# Patient Record
Sex: MALE | Race: WHITE | NOT HISPANIC OR LATINO | URBAN - METROPOLITAN AREA
[De-identification: names, ages, dates, MRNs, and addresses within clinical notes are randomized per-mention and may not be internally consistent; named-entity substitution may affect disease eponyms.]

---

## 2022-11-09 ENCOUNTER — OFFICE (OUTPATIENT)
Dept: URBAN - METROPOLITAN AREA CLINIC 105 | Facility: CLINIC | Age: 62
End: 2022-11-09

## 2022-11-09 VITALS
HEART RATE: 67 BPM | HEIGHT: 70 IN | DIASTOLIC BLOOD PRESSURE: 82 MMHG | SYSTOLIC BLOOD PRESSURE: 142 MMHG | WEIGHT: 230 LBS | TEMPERATURE: 95 F

## 2022-11-09 DIAGNOSIS — Z87.11 PERSONAL HISTORY OF PEPTIC ULCER DISEASE: ICD-10-CM

## 2022-11-09 PROCEDURE — 99203 OFFICE O/P NEW LOW 30 MIN: CPT

## 2022-11-09 RX ORDER — SODIUM PICOSULFATE, MAGNESIUM OXIDE, AND ANHYDROUS CITRIC ACID 10; 3.5; 12 MG/160ML; G/160ML; G/160ML
LIQUID ORAL
Qty: 1 | Refills: 0 | Status: ACTIVE
Start: 2022-11-09

## 2022-11-09 NOTE — SERVICENOTES
– Ordered colonoscopy for colon cancer screening with 2-day prep
– Drink clear liquids 2 days before your colonoscopy in 2 days before your colonoscopy take four 5 mg bisacodyl tablets

## 2022-11-09 NOTE — SERVICEHPINOTES
62-year-old male who presents for evaluation for colonoscopy for colon cancer screening. He previously had a colonoscopy 4/2017 that was normal but the prep was suboptimal. He previously had an EGD and colonoscopy in 2007 for melena and was found to have a peptic ulcer at that time. Patient states that he is moving his bowels without issue but may have rare constipation. He denies blood in stool or black stool. He denies issues with heartburn or reflux. He denies personal history of polyps. He denies family history of colon cancer. He does not take any blood thinners.
Reviewed recent labs. Hemoglobin A1c 7.0%. CBC within normal limits. Lipids with triglycerides 177. CMP with glucose 147 but normal liver tests.  
No

## 2022-12-12 ENCOUNTER — AMBULATORY SURGICAL CENTER (OUTPATIENT)
Dept: URBAN - METROPOLITAN AREA SURGERY 26 | Facility: SURGERY | Age: 62
End: 2022-12-12
Payer: COMMERCIAL

## 2022-12-12 ENCOUNTER — OFFICE (OUTPATIENT)
Dept: URBAN - METROPOLITAN AREA PATHOLOGY 24 | Facility: PATHOLOGY | Age: 62
End: 2022-12-12
Payer: COMMERCIAL

## 2022-12-12 DIAGNOSIS — K63.5 POLYP OF COLON: ICD-10-CM

## 2022-12-12 DIAGNOSIS — Z12.11 ENCOUNTER FOR SCREENING FOR MALIGNANT NEOPLASM OF COLON: ICD-10-CM

## 2022-12-12 PROCEDURE — 88305 TISSUE EXAM BY PATHOLOGIST: CPT | Performed by: STUDENT IN AN ORGANIZED HEALTH CARE EDUCATION/TRAINING PROGRAM

## 2022-12-12 PROCEDURE — 45380 COLONOSCOPY AND BIOPSY: CPT | Mod: 33
